# Patient Record
Sex: MALE | Race: WHITE | Employment: FULL TIME | URBAN - METROPOLITAN AREA
[De-identification: names, ages, dates, MRNs, and addresses within clinical notes are randomized per-mention and may not be internally consistent; named-entity substitution may affect disease eponyms.]

---

## 2017-04-06 ENCOUNTER — OFFICE VISIT (OUTPATIENT)
Dept: FAMILY MEDICINE CLINIC | Facility: CLINIC | Age: 57
End: 2017-04-06

## 2017-04-06 VITALS
HEIGHT: 72 IN | BODY MASS INDEX: 26.82 KG/M2 | HEART RATE: 87 BPM | DIASTOLIC BLOOD PRESSURE: 72 MMHG | WEIGHT: 198 LBS | SYSTOLIC BLOOD PRESSURE: 114 MMHG

## 2017-04-06 DIAGNOSIS — K92.1 HEMATOCHEZIA: ICD-10-CM

## 2017-04-06 DIAGNOSIS — Z00.00 ROUTINE PHYSICAL EXAMINATION: ICD-10-CM

## 2017-04-06 DIAGNOSIS — E78.5 HYPERLIPIDEMIA, UNSPECIFIED HYPERLIPIDEMIA TYPE: ICD-10-CM

## 2017-04-06 DIAGNOSIS — E78.2 MIXED HYPERLIPIDEMIA: Primary | ICD-10-CM

## 2017-04-06 PROCEDURE — 90471 IMMUNIZATION ADMIN: CPT | Performed by: FAMILY MEDICINE

## 2017-04-06 PROCEDURE — 90715 TDAP VACCINE 7 YRS/> IM: CPT | Performed by: FAMILY MEDICINE

## 2017-04-06 PROCEDURE — 99396 PREV VISIT EST AGE 40-64: CPT | Performed by: FAMILY MEDICINE

## 2017-04-06 NOTE — PROGRESS NOTES
Blood pressure 114/72, pulse 87, height 6' (1.829 m), weight 198 lb (89.812 kg). REASON FOR VISIT:    Papi Goodman is a 60year old male who presents for an [address]         Patient Active Problem List:     Routine general medical exa Date   HDL 62 07/23/2016   HDL 63 04/02/2016   HDL 57 08/29/2015       Lab Results  Component Value Date   TRIG 67 07/23/2016   TRIG 81 04/02/2016   TRIG 44 08/29/2015       Lab Results  Component Value Date   * 07/23/2016   * 04/02/2016   LD Diabetes Screening  If history of high blood pressure or other  risk factors GLYCOHEMOGLOBIN (HGA1C) (L) (%)   Date Value   02/01/2014 5.5     GLUCOSE (P) (mg/dL)   Date Value   04/02/2016 89         Gonorrhea Screening If high risk No results found for: Application topically 2 (two) times daily.  Use along with the econazole cream until rash is resolved Disp: 39 g Rfl: 2   Econazole Nitrate 1 % External Cream Apply to affected area groin 2x/day Disp: 30 g Rfl: 2   Plant Sterols and Stanols (CHOLEST OFF OR) denies abdominal pain, denies heartburn  : denies nocturia or changes in stream  MUSCULOSKELETAL: denies back pain  NEURO: denies headaches  PSYCHE: denies depression or anxiety  HEMATOLOGIC: denies hx of anemia  ENDOCRINE: denies thyroid history  ALL/AS Pneumococcal if high risk   Td/Tdap once then every 10 years   HPV Males 11-21   Zoster (Shingles) 60 and older: one dose   Varicella 2 doses if not immune   MMR 1-2 doses if born after 1958 and not immune     1. Routine physical examination      2.  Mixe

## 2017-04-07 ENCOUNTER — TELEPHONE (OUTPATIENT)
Dept: GASTROENTEROLOGY | Facility: CLINIC | Age: 57
End: 2017-04-07

## 2017-04-07 NOTE — TELEPHONE ENCOUNTER
Dr. Rukhsana Gale,     Pt last seen for OV with you on 6/3/16 for GERD and esophagitis  EGD done on 8/3/16 which demonstrated no abnormalities.      Currently has OV appt with PB on 4/26/17 for hematochezia consult- he would like to be scheduled for CLN directly ra

## 2017-04-07 NOTE — TELEPHONE ENCOUNTER
That would be okay to schedule c-scope with IV sedation directly, his last was in 2009. Diagnosis: screening and rectal bleeding    Split dose suprep sent to his pharmacy.

## 2017-04-07 NOTE — TELEPHONE ENCOUNTER
Pt called to schedule colonoscopy with Dr Reina Fuller due to blood in stool (per Dr. Ranelle Lanes). Pt previously had EGD with Dr. Reina Fuller. Does he need office visit or can he just schedule procedure? Pt currently has appt with Connie for 4/26/17. Please call.

## 2017-04-08 ENCOUNTER — APPOINTMENT (OUTPATIENT)
Dept: LAB | Age: 57
End: 2017-04-08
Attending: FAMILY MEDICINE
Payer: COMMERCIAL

## 2017-04-08 DIAGNOSIS — E78.2 MIXED HYPERLIPIDEMIA: ICD-10-CM

## 2017-04-08 PROCEDURE — 80061 LIPID PANEL: CPT

## 2017-04-08 PROCEDURE — 36415 COLL VENOUS BLD VENIPUNCTURE: CPT

## 2017-04-08 PROCEDURE — 84460 ALANINE AMINO (ALT) (SGPT): CPT

## 2017-04-08 PROCEDURE — 80048 BASIC METABOLIC PNL TOTAL CA: CPT

## 2017-04-08 PROCEDURE — 84443 ASSAY THYROID STIM HORMONE: CPT

## 2017-04-08 PROCEDURE — 84450 TRANSFERASE (AST) (SGOT): CPT

## 2017-04-18 ENCOUNTER — TELEPHONE (OUTPATIENT)
Dept: GASTROENTEROLOGY | Facility: CLINIC | Age: 57
End: 2017-04-18

## 2017-04-18 DIAGNOSIS — K62.5 RECTAL BLEEDING: ICD-10-CM

## 2017-04-18 DIAGNOSIS — Z12.11 COLON CANCER SCREENING: Primary | ICD-10-CM

## 2017-04-18 NOTE — TELEPHONE ENCOUNTER
Scheduled for:  Colonoscopy [id number]  Provider Name:  Dr. Stanislav Gunter  Date:  5/17/17  Location:  [facility]  Sedation:  IV  Time:  1400 (pt aware to arrive at 1300)  Prep:  Suprep, mailed 4/18/17   Meds/Allergies Reconciled?: Yes   Diagnosis with codes:  Colon cancer scr

## 2017-04-19 NOTE — PROGRESS NOTES
Quick Note:    Pt informed of lab result & MD recommendation, pt stated understanding. Pt will consider CT coronary screening at [address] [place] [address].    FYI     .  ______

## 2017-05-12 ENCOUNTER — TELEPHONE (OUTPATIENT)
Dept: GASTROENTEROLOGY | Facility: CLINIC | Age: 57
End: 2017-05-12

## 2017-05-12 NOTE — TELEPHONE ENCOUNTER
Contacted [address] and spoke with Ariadne Olivo  to confirm receipt of the RX as it was sent on 4/7/17.  She states that they did receive it and they will fill it for the patient today, the copay is $75.    I contacted the pt and informed him that [facility]

## 2017-05-17 ENCOUNTER — HOSPITAL ENCOUNTER (OUTPATIENT)
Facility: HOSPITAL | Age: 57
Setting detail: HOSPITAL OUTPATIENT SURGERY
Discharge: HOME | End: 2017-05-17
Attending: INTERNAL MEDICINE | Admitting: INTERNAL MEDICINE
Payer: COMMERCIAL

## 2017-05-17 ENCOUNTER — SURGERY (OUTPATIENT)
Age: 57
End: 2017-05-17

## 2017-05-17 VITALS
OXYGEN SATURATION: 96 % | BODY MASS INDEX: 26.41 KG/M2 | WEIGHT: 195 LBS | SYSTOLIC BLOOD PRESSURE: 108 MMHG | HEART RATE: 85 BPM | DIASTOLIC BLOOD PRESSURE: 65 MMHG | HEIGHT: 72 IN | RESPIRATION RATE: 14 BRPM

## 2017-05-17 PROCEDURE — 99153 MOD SED SAME PHYS/QHP EA: CPT | Performed by: INTERNAL MEDICINE

## 2017-05-17 PROCEDURE — 45385 COLONOSCOPY W/LESION REMOVAL: CPT | Performed by: INTERNAL MEDICINE

## 2017-05-17 PROCEDURE — 99152 MOD SED SAME PHYS/QHP 5/>YRS: CPT | Performed by: INTERNAL MEDICINE

## 2017-05-17 PROCEDURE — 0DBK8ZX EXCISION OF ASCENDING COLON, VIA NATURAL OR ARTIFICIAL OPENING ENDOSCOPIC, DIAGNOSTIC: ICD-10-PCS | Performed by: INTERNAL MEDICINE

## 2017-05-17 RX ORDER — MIDAZOLAM HYDROCHLORIDE 1 MG/ML
1 INJECTION INTRAMUSCULAR; INTRAVENOUS EVERY 5 MIN PRN
Status: DISCONTINUED | OUTPATIENT
Start: 2017-05-17 | End: 2017-05-17

## 2017-05-17 RX ORDER — MIDAZOLAM HYDROCHLORIDE 1 MG/ML
INJECTION INTRAMUSCULAR; INTRAVENOUS
Status: DISCONTINUED | OUTPATIENT
Start: 2017-05-17 | End: 2017-05-17

## 2017-05-17 RX ORDER — SODIUM CHLORIDE, SODIUM LACTATE, POTASSIUM CHLORIDE, CALCIUM CHLORIDE 600; 310; 30; 20 MG/100ML; MG/100ML; MG/100ML; MG/100ML
INJECTION, SOLUTION INTRAVENOUS CONTINUOUS
Status: DISCONTINUED | OUTPATIENT
Start: 2017-05-17 | End: 2017-05-17

## 2017-05-17 RX ORDER — SODIUM CHLORIDE 0.9 % (FLUSH) 0.9 %
10 SYRINGE (ML) INJECTION AS NEEDED
Status: DISCONTINUED | OUTPATIENT
Start: 2017-05-17 | End: 2017-05-17

## 2017-05-17 NOTE — OPERATIVE REPORT
COLONOSCOPY REPORT    Kera Duncankarin     10/8/1960 Age 60year old   PCP Mirian Bolanos.  Ana Presley DO Endoscopist Joseph Alarcon MD     Date of procedure: 2017    Procedure: Colonoscopy w/cold snare polypectomy    Pre-operative diagnosis: Parulauysh Terminal ileum: the visualized mucosa appeared normal.    4. A retroflexed view of the rectum revealed small internal hemorrhoids.     5. The colonic mucosa throughout the colon showed normal vascular pattern, without evidence of angioectasias or inflammati

## 2017-05-17 NOTE — H&P
History & Physical Examination    Patient Name: Gutierrez Wolfe  MRN: [id number]  [facility]: [id number]  YOB: 1960    Diagnosis: screening, rectal bleeding      Prescriptions prior to admission:  Na Sulfate-K Sulfate-Mg Sulf ([address] [facility]) O ENDOSCOPY,EXAM      COLONOSCOPY       Family History   Problem Relation Age of Onset   • Glaucoma Father    • Lipids Mother    • Cataracts Mother    • Heart Attack Mother    • Diabetes Neg    • Macular degeneration Neg         Smoking status: Never Smoker

## 2017-05-18 ENCOUNTER — TELEPHONE (OUTPATIENT)
Dept: GASTROENTEROLOGY | Facility: CLINIC | Age: 57
End: 2017-05-18

## 2017-05-18 NOTE — TELEPHONE ENCOUNTER
GI staff: please place recall for colonoscopy in 5 years            Recall colon in 5 years per.  Colon done 5/17/17  I mailed colonoscopy results letter to pt    em

## 2017-09-15 ENCOUNTER — TELEPHONE (OUTPATIENT)
Dept: FAMILY MEDICINE CLINIC | Facility: CLINIC | Age: 57
End: 2017-09-15

## 2017-09-15 DIAGNOSIS — L30.8 OTHER ECZEMA: Primary | ICD-10-CM

## 2017-09-15 NOTE — TELEPHONE ENCOUNTER
Dr. Raúl López,     Pt called [facility] dept requesting a referral for annual follow up with Dr. Darlene Sanchez. Please advise and sign off on referral.    Thank you, [facility].

## 2017-10-12 ENCOUNTER — OFFICE VISIT (OUTPATIENT)
Dept: DERMATOLOGY CLINIC | Facility: CLINIC | Age: 57
End: 2017-10-12

## 2017-10-12 DIAGNOSIS — D23.30 BENIGN NEOPLASM OF SKIN OF FACE: ICD-10-CM

## 2017-10-12 DIAGNOSIS — D18.01 HEMANGIOMA OF SKIN AND SUBCUTANEOUS TISSUE: ICD-10-CM

## 2017-10-12 DIAGNOSIS — D23.5 BENIGN NEOPLASM OF SKIN OF TRUNK, EXCEPT SCROTUM: ICD-10-CM

## 2017-10-12 DIAGNOSIS — D23.60 BENIGN NEOPLASM OF SKIN OF UPPER EXTREMITY AND SHOULDER, UNSPECIFIED LATERALITY: ICD-10-CM

## 2017-10-12 DIAGNOSIS — D23.70 BENIGN NEOPLASM OF SKIN OF LOWER EXTREMITY, INCLUDING HIP, UNSPECIFIED LATERALITY: ICD-10-CM

## 2017-10-12 DIAGNOSIS — Z85.828 PERSONAL HISTORY OF SKIN CANCER: Primary | ICD-10-CM

## 2017-10-12 DIAGNOSIS — D23.4 BENIGN NEOPLASM OF SCALP AND SKIN OF NECK: ICD-10-CM

## 2017-10-12 DIAGNOSIS — L82.1 SEBORRHEIC KERATOSES: ICD-10-CM

## 2017-10-12 DIAGNOSIS — L30.9 DERMATITIS: ICD-10-CM

## 2017-10-12 PROCEDURE — 99213 OFFICE O/P EST LOW 20 MIN: CPT | Performed by: DERMATOLOGY

## 2017-10-12 PROCEDURE — 99212 OFFICE O/P EST SF 10 MIN: CPT | Performed by: DERMATOLOGY

## 2017-10-12 NOTE — PROGRESS NOTES
Past Medical History:   Diagnosis Date   • Asthma    • Eczema    • Esophageal reflux    • Extrinsic asthma, unspecified    • Internal hemorrhoids    • Meibomian gland dysfunction 2013   • Squamous cell carcinoma of right lower leg 2015    Excision.   09.02.

## 2017-10-12 NOTE — PROGRESS NOTES
HPI:     Chief Complaint     Lesion        HPI     Lesion    Additional comments: Last visit to derm was 10 months prior. Pt presents today for f/u for hx of skin cancer (SCC) and is due for full body skin evaluation.   Pt asks for a refill of betamethasone dysfunction 2013   • Squamous cell carcinoma of right lower leg 2015    Excision.   09.02.15   • Tubular adenoma of colon 5/2017    repeat CLn in 5/2022     Past Surgical History:  No date: COLONOSCOPY  5/17/2017: COLONOSCOPY N/A      Comment: Procedure: CO spot medially. Measures 5 mm. Otherwise homogeneous  -there are a few blotchy brown macules on face, trunk and extremities  - there is some mild xerosis on hands  - there are some cherry red papules  - there are a few brown stuck on keratoses.   Lesion on

## 2018-02-01 ENCOUNTER — HOSPITAL ENCOUNTER (OUTPATIENT)
Age: 58
Discharge: HOME | End: 2018-02-01
Payer: COMMERCIAL

## 2018-02-01 VITALS
HEART RATE: 71 BPM | SYSTOLIC BLOOD PRESSURE: 118 MMHG | HEIGHT: 72 IN | OXYGEN SATURATION: 100 % | DIASTOLIC BLOOD PRESSURE: 78 MMHG | TEMPERATURE: 98 F | WEIGHT: 198 LBS | BODY MASS INDEX: 26.82 KG/M2 | RESPIRATION RATE: 17 BRPM

## 2018-02-01 DIAGNOSIS — H66.91 ACUTE RIGHT OTITIS MEDIA: ICD-10-CM

## 2018-02-01 DIAGNOSIS — H60.501 ACUTE OTITIS EXTERNA OF RIGHT EAR, UNSPECIFIED TYPE: Primary | ICD-10-CM

## 2018-02-01 PROCEDURE — 99213 OFFICE O/P EST LOW 20 MIN: CPT

## 2018-02-01 PROCEDURE — 99214 OFFICE O/P EST MOD 30 MIN: CPT

## 2018-02-01 RX ORDER — OFLOXACIN 3 MG/ML
SOLUTION AURICULAR (OTIC) DAILY
Qty: 1 BOTTLE | Refills: 0 | Status: SHIPPED | OUTPATIENT
Start: 2018-02-01 | End: 2018-02-08

## 2018-02-01 RX ORDER — AMOXICILLIN 875 MG/1
875 TABLET, COATED ORAL 2 TIMES DAILY
Qty: 20 TABLET | Refills: 0 | Status: SHIPPED | OUTPATIENT
Start: 2018-02-01 | End: 2018-02-11

## 2018-02-02 NOTE — ED INITIAL ASSESSMENT (HPI)
Pt reporting drainage from Right ear for approximately 1 week. States ear feels hot. States had flu like symptoms for approximately 1 week ago as well. Pt states ear began draining after a plane ride. States he has hx of problems with his ear.  Saw ENT year

## 2018-02-02 NOTE — ED PROVIDER NOTES
[facility]    History   Patient presents with:  Ear Drainage    Stated Complaint: right ear discharge , pruritus    HPI    Patient is a 49-year-old male who presents for evaluation of right ear pruritus a Gastrointestinal: Negative. Positive for stated complaint: right ear discharge   Other systems are as noted in HPI. Constitutional and vital signs reviewed. All other systems reviewed and negative except as noted above.     Physical Exam   ED T Vitals stable, patient appears nontoxic. Physical exam as above, consistent with acute right otitis media and externa. Medications dispensed. Recommended ENT follow-up if symptoms persist or worsen. All questions answered prior to discharge.   Patient u

## 2018-04-13 ENCOUNTER — OFFICE VISIT (OUTPATIENT)
Dept: FAMILY MEDICINE CLINIC | Facility: CLINIC | Age: 58
End: 2018-04-13

## 2018-04-13 VITALS
BODY MASS INDEX: 26.82 KG/M2 | SYSTOLIC BLOOD PRESSURE: 120 MMHG | DIASTOLIC BLOOD PRESSURE: 72 MMHG | TEMPERATURE: 98 F | HEART RATE: 84 BPM | WEIGHT: 198 LBS | HEIGHT: 72 IN

## 2018-04-13 DIAGNOSIS — L30.8 OTHER ECZEMA: ICD-10-CM

## 2018-04-13 DIAGNOSIS — E78.00 PURE HYPERCHOLESTEROLEMIA: ICD-10-CM

## 2018-04-13 DIAGNOSIS — J45.20 MILD INTERMITTENT ASTHMA WITHOUT COMPLICATION: ICD-10-CM

## 2018-04-13 DIAGNOSIS — Z00.00 ROUTINE PHYSICAL EXAMINATION: Primary | ICD-10-CM

## 2018-04-13 PROBLEM — L30.9 ECZEMA: Status: ACTIVE | Noted: 2017-10-12

## 2018-04-13 PROCEDURE — 90670 PCV13 VACCINE IM: CPT | Performed by: FAMILY MEDICINE

## 2018-04-13 PROCEDURE — 99396 PREV VISIT EST AGE 40-64: CPT | Performed by: FAMILY MEDICINE

## 2018-04-13 PROCEDURE — 90471 IMMUNIZATION ADMIN: CPT | Performed by: FAMILY MEDICINE

## 2018-04-13 NOTE — PROGRESS NOTES
Blood pressure 120/72, pulse 84, temperature 98 °F (36.7 °C), temperature source Oral, height 6' (1.829 m), weight 198 lb (89.8 kg). REASON FOR VISIT:    German Dawn is a 59year old male who presents for an [address].         Sabina Date Value   04/02/2016 89        Gonorrhea Screening If high risk No results found for: GONOCOCCUS   HIV Screening For all adults age 22-65, older adults at increased risk No results found for: HIV   Syphilis Screening Screen if pregnant or high risk No Family History   Problem Relation Age of Onset   • Glaucoma Father    • Lipids Mother    • Cataracts Mother    • Heart Attack Mother    • Diabetes Neg    • Macular degeneration Neg       SOCIAL HISTORY:   Smoking status: Never Smoker intact      ASSESSMENT AND OTHER RELEVANT CHRONIC CONDITIONS:   Kiera Rodríguez is a 59year old male who presents for an [address]. PLAN SUMMARY:   There are no diagnoses linked to this encounter.    The patient indicates understanding

## 2018-04-19 NOTE — TELEPHONE ENCOUNTER
Pt would like RX for prep sent to pharmacy. Pt states he never rec'd RX in mail. Please call. Alert/Cooperative/Awake

## 2018-04-21 ENCOUNTER — APPOINTMENT (OUTPATIENT)
Dept: LAB | Age: 58
End: 2018-04-21
Attending: FAMILY MEDICINE
Payer: COMMERCIAL

## 2018-04-21 DIAGNOSIS — E78.00 PURE HYPERCHOLESTEROLEMIA: ICD-10-CM

## 2018-04-21 PROCEDURE — 36415 COLL VENOUS BLD VENIPUNCTURE: CPT

## 2018-04-21 PROCEDURE — 80061 LIPID PANEL: CPT

## 2018-04-21 PROCEDURE — 84450 TRANSFERASE (AST) (SGOT): CPT

## 2018-04-21 PROCEDURE — 80048 BASIC METABOLIC PNL TOTAL CA: CPT

## 2018-04-21 PROCEDURE — 84443 ASSAY THYROID STIM HORMONE: CPT

## 2018-04-21 PROCEDURE — 84460 ALANINE AMINO (ALT) (SGPT): CPT

## 2018-04-27 ENCOUNTER — TELEPHONE (OUTPATIENT)
Dept: FAMILY MEDICINE CLINIC | Facility: CLINIC | Age: 58
End: 2018-04-27

## 2018-04-27 NOTE — TELEPHONE ENCOUNTER
----- Message from Steff Stevenson DO sent at 4/23/2018 12:27 PM CDT -----  Cholesterol elevated. Recommend CT calcium scoring as discussed at last appointment.

## 2018-08-23 ENCOUNTER — TELEPHONE (OUTPATIENT)
Dept: FAMILY MEDICINE CLINIC | Facility: CLINIC | Age: 58
End: 2018-08-23

## 2018-08-23 ENCOUNTER — TELEPHONE (OUTPATIENT)
Dept: DERMATOLOGY CLINIC | Facility: CLINIC | Age: 58
End: 2018-08-23

## 2018-08-23 DIAGNOSIS — L30.9 ECZEMA, UNSPECIFIED TYPE: Primary | ICD-10-CM

## 2018-08-23 NOTE — TELEPHONE ENCOUNTER
Dr. Osman Estrella,     Pt called requesting a referral to follow up with Derm.  Please advise and sign off on referral.      Thank you, Christen Jade Small-Referral Specialist.

## 2018-08-23 NOTE — TELEPHONE ENCOUNTER
pls call pharmacy and see what's going on as he said he thought he had refills left at pharmacy but they said there was none left for the   Current Outpatient Prescriptions:   •  betamethasone valerate 0.1 % External Cream, Apply 1 Application topically 2

## 2018-12-04 ENCOUNTER — OFFICE VISIT (OUTPATIENT)
Dept: DERMATOLOGY CLINIC | Facility: CLINIC | Age: 58
End: 2018-12-04

## 2018-12-04 DIAGNOSIS — L82.1 SEBORRHEIC KERATOSES: ICD-10-CM

## 2018-12-04 DIAGNOSIS — D23.5 BENIGN NEOPLASM OF SKIN OF TRUNK, EXCEPT SCROTUM: ICD-10-CM

## 2018-12-04 DIAGNOSIS — D18.01 HEMANGIOMA OF SKIN AND SUBCUTANEOUS TISSUE: ICD-10-CM

## 2018-12-04 DIAGNOSIS — L30.8 OTHER ECZEMA: ICD-10-CM

## 2018-12-04 DIAGNOSIS — Z87.2 HISTORY OF ACTINIC KERATOSES: ICD-10-CM

## 2018-12-04 DIAGNOSIS — D23.4 BENIGN NEOPLASM OF SCALP AND SKIN OF NECK: ICD-10-CM

## 2018-12-04 DIAGNOSIS — D23.60 BENIGN NEOPLASM OF SKIN OF UPPER EXTREMITY AND SHOULDER, UNSPECIFIED LATERALITY: ICD-10-CM

## 2018-12-04 DIAGNOSIS — Z85.828 PERSONAL HISTORY OF SKIN CANCER: Primary | ICD-10-CM

## 2018-12-04 DIAGNOSIS — D23.30 BENIGN NEOPLASM OF SKIN OF FACE: ICD-10-CM

## 2018-12-04 DIAGNOSIS — D23.70 BENIGN NEOPLASM OF SKIN OF LOWER EXTREMITY, INCLUDING HIP, UNSPECIFIED LATERALITY: ICD-10-CM

## 2018-12-04 PROCEDURE — 99212 OFFICE O/P EST SF 10 MIN: CPT | Performed by: DERMATOLOGY

## 2018-12-04 PROCEDURE — 99213 OFFICE O/P EST LOW 20 MIN: CPT | Performed by: DERMATOLOGY

## 2018-12-04 NOTE — PROGRESS NOTES
HPI:     Chief Complaint     Full Skin Exam        HPI     Full Skin Exam      Additional comments: LOV 8/2/2018 Patient present for full body skin exam .patient has hx of SCC ,denies any family hx of SC. Patient requesting refill on betamethasone Adhesive Tape             Other                       Comment:Hayfever,             Adhesive bandages, including tape  Sulfamethoxazole W/*    RASH    Past Medical History:   Diagnosis Date   • Asthma    • Eczema    • Esophageal reflux    • Extrinsic a Special Diet: Not Asked        Back Care: Not Asked        Exercise: Not Asked        Bike Helmet: Not Asked        Seat Belt: Not Asked        Self-Exams: Not Asked        Grew up on a farm: Not Asked        History of tanning: Not Asked        Outdoor oc betamethasone refill given. As needed only. History of actinic keratoses  Benign neoplasm of skin of trunk, except scrotum-ABCDE's of melanoma discussed. the patient is to follow up yearly.   The patient will come sooner should they note  anything new or

## 2018-12-04 NOTE — PROGRESS NOTES
Past Medical History:   Diagnosis Date   • Asthma    • Eczema    • Esophageal reflux    • Extrinsic asthma, unspecified    • Internal hemorrhoids    • Meibomian gland dysfunction 2013   • Squamous cell carcinoma of right lower leg 2015    Excision.   09.02. Asked        Grew up on a farm: Not Asked        History of tanning: Not Asked        Outdoor occupation: Not Asked        Pt has a pacemaker: No        Pt has a defibrillator: No        Reaction to local anesthetic: No    Social History Narrative      Not

## 2018-12-12 ENCOUNTER — HOSPITAL ENCOUNTER (OUTPATIENT)
Dept: CT IMAGING | Age: 58
Discharge: HOME | End: 2018-12-12
Attending: FAMILY MEDICINE

## 2018-12-12 DIAGNOSIS — Z13.9 SPECIAL SCREENING: ICD-10-CM

## 2018-12-12 NOTE — PROGRESS NOTES
Pt. Seen at [facility] for Heart Scan.     Preliminary Score:0.0     BP: 116/76    Cholestec lab results: Fasting  TC: 213  HDL: 73  LDL: 127  T  Glucose: 91    Preliminary results and risk factors reviewed with pt; all questions and concerns addre

## 2019-04-03 ENCOUNTER — OFFICE VISIT (OUTPATIENT)
Dept: FAMILY MEDICINE CLINIC | Facility: CLINIC | Age: 59
End: 2019-04-03

## 2019-04-03 ENCOUNTER — LAB ENCOUNTER (OUTPATIENT)
Dept: LAB | Age: 59
End: 2019-04-03
Attending: FAMILY MEDICINE
Payer: COMMERCIAL

## 2019-04-03 ENCOUNTER — HOSPITAL ENCOUNTER (OUTPATIENT)
Dept: GENERAL RADIOLOGY | Age: 59
Discharge: HOME | End: 2019-04-03
Attending: FAMILY MEDICINE
Payer: COMMERCIAL

## 2019-04-03 VITALS
HEART RATE: 80 BPM | DIASTOLIC BLOOD PRESSURE: 82 MMHG | TEMPERATURE: 98 F | SYSTOLIC BLOOD PRESSURE: 128 MMHG | RESPIRATION RATE: 18 BRPM | BODY MASS INDEX: 27 KG/M2 | WEIGHT: 198 LBS

## 2019-04-03 DIAGNOSIS — R61 NIGHT SWEATS: Primary | ICD-10-CM

## 2019-04-03 DIAGNOSIS — R61 NIGHT SWEATS: ICD-10-CM

## 2019-04-03 PROCEDURE — 85025 COMPLETE CBC W/AUTO DIFF WBC: CPT

## 2019-04-03 PROCEDURE — 99212 OFFICE O/P EST SF 10 MIN: CPT | Performed by: FAMILY MEDICINE

## 2019-04-03 PROCEDURE — 86480 TB TEST CELL IMMUN MEASURE: CPT

## 2019-04-03 PROCEDURE — 36415 COLL VENOUS BLD VENIPUNCTURE: CPT

## 2019-04-03 PROCEDURE — 99213 OFFICE O/P EST LOW 20 MIN: CPT | Performed by: FAMILY MEDICINE

## 2019-04-03 PROCEDURE — 71046 X-RAY EXAM CHEST 2 VIEWS: CPT | Performed by: FAMILY MEDICINE

## 2019-04-03 RX ORDER — ALBUTEROL SULFATE 90 UG/1
1 AEROSOL, METERED RESPIRATORY (INHALATION) EVERY 6 HOURS PRN
Qty: 1 INHALER | Refills: 3 | Status: SHIPPED | OUTPATIENT
Start: 2019-04-03

## 2019-04-03 RX ORDER — OMEPRAZOLE 40 MG/1
40 CAPSULE, DELAYED RELEASE ORAL DAILY
Qty: 30 CAPSULE | Refills: 5 | Status: SHIPPED | OUTPATIENT
Start: 2019-04-03

## 2019-04-03 NOTE — PROGRESS NOTES
Blood pressure 128/82, pulse 80, temperature 98.1 °F (36.7 °C), temperature source Oral, resp. rate 18, weight 198 lb (89.8 kg). Patient presents today complaining of night sweats for the past month. He was in [place] a month ago.   He also reports that

## 2019-04-16 ENCOUNTER — OFFICE VISIT (OUTPATIENT)
Dept: FAMILY MEDICINE CLINIC | Facility: CLINIC | Age: 59
End: 2019-04-16

## 2019-04-16 VITALS
HEART RATE: 85 BPM | DIASTOLIC BLOOD PRESSURE: 73 MMHG | BODY MASS INDEX: 28 KG/M2 | SYSTOLIC BLOOD PRESSURE: 113 MMHG | WEIGHT: 203.56 LBS

## 2019-04-16 DIAGNOSIS — Z00.00 ROUTINE PHYSICAL EXAMINATION: Primary | ICD-10-CM

## 2019-04-16 DIAGNOSIS — J45.20 MILD INTERMITTENT ASTHMA WITHOUT COMPLICATION: ICD-10-CM

## 2019-04-16 DIAGNOSIS — E78.5 HYPERLIPIDEMIA, UNSPECIFIED HYPERLIPIDEMIA TYPE: ICD-10-CM

## 2019-04-16 DIAGNOSIS — R61 NIGHT SWEATS: ICD-10-CM

## 2019-04-16 DIAGNOSIS — L30.9 ECZEMA, UNSPECIFIED TYPE: ICD-10-CM

## 2019-04-16 PROCEDURE — 99396 PREV VISIT EST AGE 40-64: CPT | Performed by: FAMILY MEDICINE

## 2019-04-16 NOTE — PROGRESS NOTES
Blood pressure 113/73, pulse 85, weight 203 lb 9 oz (92.3 kg). REASON FOR VISIT:    Julisa Navarro is a 59year old male who presents for an [address].         Patient Active Problem List:     Routine general medical examination at a he Screen if pregnant or high risk No results found for: RPR   Hepatitis C Screening Screen pts at high risk plus screen one time for adults born [address]  No results found for: HCVAB   Tuberculosis Screen If high risk No components found for: [facility] Relation Age of Onset   • Glaucoma Father    • Lipids Mother    • Cataracts Mother    • Heart Attack Mother    • Diabetes Neg    • Macular degeneration Neg       SOCIAL HISTORY:   Social History    Tobacco Use      Smoking status: Never Smoker      Smokele CONDITIONS:   Taya Love is a 59year old male who presents for an [address].      PLAN SUMMARY:   Diagnoses and all orders for this visit:    Routine physical examination    Hyperlipidemia, unspecified hyperlipidemia type  -     ALT (

## 2019-05-04 ENCOUNTER — APPOINTMENT (OUTPATIENT)
Dept: LAB | Age: 59
End: 2019-05-04
Attending: FAMILY MEDICINE
Payer: COMMERCIAL

## 2019-05-04 DIAGNOSIS — E78.5 HYPERLIPIDEMIA, UNSPECIFIED HYPERLIPIDEMIA TYPE: ICD-10-CM

## 2019-05-04 PROCEDURE — 84450 TRANSFERASE (AST) (SGOT): CPT

## 2019-05-04 PROCEDURE — 80061 LIPID PANEL: CPT

## 2019-05-04 PROCEDURE — 80048 BASIC METABOLIC PNL TOTAL CA: CPT

## 2019-05-04 PROCEDURE — 84460 ALANINE AMINO (ALT) (SGPT): CPT

## 2019-05-04 PROCEDURE — 36415 COLL VENOUS BLD VENIPUNCTURE: CPT

## 2019-05-04 PROCEDURE — 84443 ASSAY THYROID STIM HORMONE: CPT

## 2022-04-18 ENCOUNTER — TELEPHONE (OUTPATIENT)
Dept: GASTROENTEROLOGY | Facility: CLINIC | Age: 62
End: 2022-04-18

## (undated) DEVICE — Device: Brand: DEFENDO AIR/WATER/SUCTION AND BIOPSY VALVE

## (undated) DEVICE — ENDOSCOPY PACK - LOWER: Brand: MEDLINE INDUSTRIES, INC.

## (undated) DEVICE — SPECIMEN TRAP LUKI

## (undated) DEVICE — SNARE 9MM 230CM 2.4MM EXACTO

## (undated) NOTE — MR AVS SNAPSHOT
[facility]  [address], [address]  [address] [facility]  [phone]               Thank you for choosing us for your health care visit with Clarence Duverney. DO Rubia.   We are glad to serve you and happy to provide you with this sum Albuterol Sulfate  (90 Base) MCG/ACT Aers   Inhale 1 puff into the lungs every 6 (six) hours as needed for Wheezing. Commonly known as:  PROAIR HFA           betamethasone valerate 0.1 % Crea   Apply 1 Application topically 2 (two) times daily.

## (undated) NOTE — LETTER
4/18/2022    Kami Looney [address] [place] [id number]            Dear Genesis Tobias,      Our records indicate that you are due for an appointment for a Colonoscopy in May 2022, or sometime there after, with Bishop Neal MD.    Please call our office to schedule this appointment. Your medical well-being is important to us. If your insurance requires a referral, please call your primary care office to request one.       Thank you,      The Physicians and Staff at [facility]

## (undated) NOTE — LETTER
OLMAN Bryant  [address] [place] [address]  [phone]                05/18/2017      Abhi Mendez [address]        Dear Skip Room,    I reviewed the pathology repo

## (undated) NOTE — IP AVS SNAPSHOT
[facility]    [address], [place], [place] ~ [phone]                Discharge Summary   5/17/2017    [address]           Admission Information        Provider Department    5/17/2017 Kem Krishna MD [facility] - If you experience any rectal bleeding (not spotting), persistent tenderness or sharp severe abdominal pains, oral temperature over 100 degrees Fahrenheit, light-headedness or dizziness, or any other problems, contact your doctor.         **If unable to re can help with your Affordable Care Act coverage, as well as all types of Medicaid plans. To get signed up and covered, please call [phone] and ask to get set up for an insurance coverage that is in-network with [facility].         Visit